# Patient Record
Sex: FEMALE | Race: BLACK OR AFRICAN AMERICAN | NOT HISPANIC OR LATINO | Employment: FULL TIME | URBAN - METROPOLITAN AREA
[De-identification: names, ages, dates, MRNs, and addresses within clinical notes are randomized per-mention and may not be internally consistent; named-entity substitution may affect disease eponyms.]

---

## 2022-05-04 ENCOUNTER — TELEPHONE (OUTPATIENT)
Dept: NEUROSURGERY | Facility: CLINIC | Age: 32
End: 2022-05-04
Payer: COMMERCIAL

## 2022-05-04 NOTE — TELEPHONE ENCOUNTER
Spoke with patient and told her Dr. Strong needs imaging on disk prior to appointment. She confiremd she will bring imaging by then.

## 2022-05-09 ENCOUNTER — OFFICE VISIT (OUTPATIENT)
Dept: NEUROSURGERY | Facility: CLINIC | Age: 32
End: 2022-05-09
Payer: COMMERCIAL

## 2022-05-09 VITALS — WEIGHT: 220 LBS | BODY MASS INDEX: 33.34 KG/M2 | HEIGHT: 68 IN

## 2022-05-09 DIAGNOSIS — I10 HYPERTENSION, UNSPECIFIED TYPE: ICD-10-CM

## 2022-05-09 DIAGNOSIS — G44.89 OTHER HEADACHE SYNDROME: Primary | ICD-10-CM

## 2022-05-09 DIAGNOSIS — E66.9 OBESITY (BMI 30.0-34.9): ICD-10-CM

## 2022-05-09 PROCEDURE — 99999 PR PBB SHADOW E&M-EST. PATIENT-LVL III: CPT | Mod: PBBFAC,,, | Performed by: STUDENT IN AN ORGANIZED HEALTH CARE EDUCATION/TRAINING PROGRAM

## 2022-05-09 PROCEDURE — 3008F PR BODY MASS INDEX (BMI) DOCUMENTED: ICD-10-PCS | Mod: CPTII,S$GLB,, | Performed by: STUDENT IN AN ORGANIZED HEALTH CARE EDUCATION/TRAINING PROGRAM

## 2022-05-09 PROCEDURE — 1160F PR REVIEW ALL MEDS BY PRESCRIBER/CLIN PHARMACIST DOCUMENTED: ICD-10-PCS | Mod: CPTII,S$GLB,, | Performed by: STUDENT IN AN ORGANIZED HEALTH CARE EDUCATION/TRAINING PROGRAM

## 2022-05-09 PROCEDURE — 1159F PR MEDICATION LIST DOCUMENTED IN MEDICAL RECORD: ICD-10-PCS | Mod: CPTII,S$GLB,, | Performed by: STUDENT IN AN ORGANIZED HEALTH CARE EDUCATION/TRAINING PROGRAM

## 2022-05-09 PROCEDURE — 1159F MED LIST DOCD IN RCRD: CPT | Mod: CPTII,S$GLB,, | Performed by: STUDENT IN AN ORGANIZED HEALTH CARE EDUCATION/TRAINING PROGRAM

## 2022-05-09 PROCEDURE — 99999 PR PBB SHADOW E&M-EST. PATIENT-LVL III: ICD-10-PCS | Mod: PBBFAC,,, | Performed by: STUDENT IN AN ORGANIZED HEALTH CARE EDUCATION/TRAINING PROGRAM

## 2022-05-09 PROCEDURE — 1160F RVW MEDS BY RX/DR IN RCRD: CPT | Mod: CPTII,S$GLB,, | Performed by: STUDENT IN AN ORGANIZED HEALTH CARE EDUCATION/TRAINING PROGRAM

## 2022-05-09 PROCEDURE — 99203 PR OFFICE/OUTPT VISIT, NEW, LEVL III, 30-44 MIN: ICD-10-PCS | Mod: S$GLB,,, | Performed by: STUDENT IN AN ORGANIZED HEALTH CARE EDUCATION/TRAINING PROGRAM

## 2022-05-09 PROCEDURE — 99203 OFFICE O/P NEW LOW 30 MIN: CPT | Mod: S$GLB,,, | Performed by: STUDENT IN AN ORGANIZED HEALTH CARE EDUCATION/TRAINING PROGRAM

## 2022-05-09 PROCEDURE — 3008F BODY MASS INDEX DOCD: CPT | Mod: CPTII,S$GLB,, | Performed by: STUDENT IN AN ORGANIZED HEALTH CARE EDUCATION/TRAINING PROGRAM

## 2022-05-09 RX ORDER — AMLODIPINE BESYLATE 5 MG/1
5 TABLET ORAL NIGHTLY
COMMUNITY
Start: 2022-05-03

## 2022-05-09 RX ORDER — MECLIZINE HYDROCHLORIDE 25 MG/1
25 TABLET ORAL 3 TIMES DAILY PRN
COMMUNITY
Start: 2022-04-06

## 2022-05-09 RX ORDER — HYDROCHLOROTHIAZIDE 25 MG/1
25 TABLET ORAL DAILY
COMMUNITY

## 2022-05-09 RX ORDER — PROPRANOLOL HYDROCHLORIDE 10 MG/1
10 TABLET ORAL 2 TIMES DAILY
COMMUNITY
Start: 2022-05-03

## 2022-05-09 RX ORDER — ACETAZOLAMIDE 250 MG/1
250 TABLET ORAL 2 TIMES DAILY
COMMUNITY
Start: 2022-05-03 | End: 2022-05-31

## 2022-05-09 NOTE — PROGRESS NOTES
Neurosurgery  History & Physical    SUBJECTIVE:     Chief Complaint: pseudotumor cerebri    History of Present Illness:  Magdalena Valdovinos is a 30 yo female who was referred by Sagrario Golden NP for evaluation of prior diagnosis of pseudotumor cerebri.  She reports daily headaches associated with dizziness with onset in October 2021 that have been progressive and poorly controled.  Symptoms occur daily and can be in the morning or afternoon and are 8-9/10 described as dull pain radiating from occiput to eyes. +photosensitivity and phonophobia.  Had poorly controlled hypertension with recent improvement on HCTZ, amlodipine and diamox. Takes Nurtec qod.      Per review of outside of outside records, she has seen ENT, neurology and cardiology. She had an outside MRI/MRA/MRV with reported narrowing of the bilateral transverse sinuses.    Rekha Ott NP- neurology  Dr. Mao Mas, MS ([facility])- last eye exam nml per report in April 2022    BMI 33.45    Review of patient's allergies indicates:  Not on File    No current outpatient medications on file.     No current facility-administered medications for this visit.       No past medical history on file.  No past surgical history on file.  Family History    None       Social History     Socioeconomic History    Marital status: Single       Review of Systems   Eyes: Positive for photophobia.   Neurological: Positive for dizziness and headaches.   All other systems reviewed and are negative.      OBJECTIVE:     Vital Signs     There is no height or weight on file to calculate BMI.      Physical Exam:  Nursing note and vitals reviewed.  General: well developed, well nourished, no distress.   Pulmonary: no signs of respiratory distress, comfortable appearing on room air  Abdomen: soft, non-distended, not tender to palpation  Skin: Skin is warm, dry and intact.  Extremities: no edema, intact distal pulses    Neuro:  Mental Status: Alert and oriented.  Oriented x 4  Language/language: No aphasia. No dysarthria. Names 3/3 objects & repetition intact  Cranial nerves: PERRL, EOMI, face symmetric, tongue/palate midline  Sensory: intact to light touch throughout  Motor Strength:Moves all extremities spontaneously with good tone.  Full strength upper and lower extremities. No abnormal movements seen. No pronator drift  Cerebellar: Finger-to-nose: intact bilaterally   Gait stable, fluid. mild difficulty with tandem gait: Able to walk on heels & toes    Diagnostic Results:  MRI/MRA/MRV brain w/wo was personally reviewed- cavum septum pellucidum with normal ventricular size without evidence of hydrocephalus, no mass lesion or vascular malformation appreciated.  Dural venous sinuses appear patent, some distal narrowing of the bilateral transverse sinuses as noted on outside radiology report    ASSESSMENT/PLAN:     30 yo female with history of poorly controlled hypertension and daily headaches.  I reviewed the imaging findings with the patient and her mother.  Will refer to neurology for headache management and additional evaluation.  No neurosurgical intervention is currently planned and patient can follow up as needed.        Note dictated with voice recognition software, please excuse any grammatical errors.

## 2022-05-10 ENCOUNTER — TELEPHONE (OUTPATIENT)
Dept: NEUROLOGY | Facility: CLINIC | Age: 32
End: 2022-05-10
Payer: COMMERCIAL

## 2022-05-10 NOTE — TELEPHONE ENCOUNTER
"Referral received from Dr. Strong for "other HA syndrome". Pt has been followed previously by Rekha Ott NP of [facility] in [place]. She is currently on propanolol 10mg BID and Nurtec(started in March 2022) every other day. Pt will sometimes use Excedrin for abortive relief (moderate but not complete).    Would have offered VV appt but patient resides in [place]. In-person appt offered/accepted for May 17th @ 8:30am with DEE Sims. Appt details viewable in patient portal.   "

## 2022-05-10 NOTE — TELEPHONE ENCOUNTER
----- Message from Sean Hinton sent at 5/10/2022  1:05 PM CDT -----  Type:  Sooner Apoointment Request    Caller is requesting a sooner appointment.  Caller declined first available appointment listed below.  Caller will not accept being placed on the waitlist and is requesting a message be sent to doctor.  Name of Caller:Magdalena Bonifacio     When is the first available appointment?no available appointments    Symptoms:Other headache syndrome     Would the patient rather a call back or a response via [facility]? Call back     Best Call Back Number:[phone] (mobile)     Additional Information:Referring Provider: KARIME HINES

## 2022-05-18 ENCOUNTER — TELEPHONE (OUTPATIENT)
Dept: NEUROLOGY | Facility: CLINIC | Age: 32
End: 2022-05-18
Payer: COMMERCIAL

## 2022-05-18 NOTE — TELEPHONE ENCOUNTER
Spoke with patient and informed her that provider does not do short term/long term disability. However, provider can do intermediate disability and patient verbally understood.    Also let patient know to bring in any FMLA forms that may need to be signed to help accommodate patient's need.

## 2022-05-18 NOTE — TELEPHONE ENCOUNTER
----- Message from Sindy Beto sent at 5/18/2022 11:00 AM CDT -----  Patient Call Back    Who Called: PT     What is the request in detail: pt calling to speak with someone regarding her upcoming appointment. The pt stated that this appointment is for a second opinion. The pt stated that she is currently on short term disability and her job have some questions, if she should return back to work before this visit. Pls advise.    Can the clinic reply by [facility]?    Best Call Back Number: [phone]       Edouard Marte)  Geriatric Medicine; Internal Medicine  [address] [place] [address]  Phone: [phone]  Fax: [phone]  Established Patient  Follow Up Time: 1 month

## 2022-05-31 ENCOUNTER — OFFICE VISIT (OUTPATIENT)
Dept: NEUROLOGY | Facility: CLINIC | Age: 32
End: 2022-05-31
Payer: COMMERCIAL

## 2022-05-31 VITALS
BODY MASS INDEX: 36.34 KG/M2 | DIASTOLIC BLOOD PRESSURE: 69 MMHG | SYSTOLIC BLOOD PRESSURE: 110 MMHG | WEIGHT: 231.5 LBS | HEART RATE: 63 BPM | HEIGHT: 67 IN

## 2022-05-31 DIAGNOSIS — H53.8 BLURRY VISION: ICD-10-CM

## 2022-05-31 DIAGNOSIS — G43.109 MIGRAINE WITH AURA AND WITHOUT STATUS MIGRAINOSUS, NOT INTRACTABLE: Primary | ICD-10-CM

## 2022-05-31 DIAGNOSIS — G93.2 IIH (IDIOPATHIC INTRACRANIAL HYPERTENSION): ICD-10-CM

## 2022-05-31 DIAGNOSIS — G44.89 OTHER HEADACHE SYNDROME: ICD-10-CM

## 2022-05-31 PROCEDURE — 1160F PR REVIEW ALL MEDS BY PRESCRIBER/CLIN PHARMACIST DOCUMENTED: ICD-10-PCS | Mod: CPTII,S$GLB,, | Performed by: PHYSICIAN ASSISTANT

## 2022-05-31 PROCEDURE — 99999 PR PBB SHADOW E&M-EST. PATIENT-LVL V: ICD-10-PCS | Mod: PBBFAC,,, | Performed by: PHYSICIAN ASSISTANT

## 2022-05-31 PROCEDURE — 1159F PR MEDICATION LIST DOCUMENTED IN MEDICAL RECORD: ICD-10-PCS | Mod: CPTII,S$GLB,, | Performed by: PHYSICIAN ASSISTANT

## 2022-05-31 PROCEDURE — 3078F PR MOST RECENT DIASTOLIC BLOOD PRESSURE < 80 MM HG: ICD-10-PCS | Mod: CPTII,S$GLB,, | Performed by: PHYSICIAN ASSISTANT

## 2022-05-31 PROCEDURE — 99999 PR PBB SHADOW E&M-EST. PATIENT-LVL V: CPT | Mod: PBBFAC,,, | Performed by: PHYSICIAN ASSISTANT

## 2022-05-31 PROCEDURE — 3078F DIAST BP <80 MM HG: CPT | Mod: CPTII,S$GLB,, | Performed by: PHYSICIAN ASSISTANT

## 2022-05-31 PROCEDURE — 99205 PR OFFICE/OUTPT VISIT, NEW, LEVL V, 60-74 MIN: ICD-10-PCS | Mod: S$GLB,,, | Performed by: PHYSICIAN ASSISTANT

## 2022-05-31 PROCEDURE — 3074F PR MOST RECENT SYSTOLIC BLOOD PRESSURE < 130 MM HG: ICD-10-PCS | Mod: CPTII,S$GLB,, | Performed by: PHYSICIAN ASSISTANT

## 2022-05-31 PROCEDURE — 99205 OFFICE O/P NEW HI 60 MIN: CPT | Mod: S$GLB,,, | Performed by: PHYSICIAN ASSISTANT

## 2022-05-31 PROCEDURE — 1160F RVW MEDS BY RX/DR IN RCRD: CPT | Mod: CPTII,S$GLB,, | Performed by: PHYSICIAN ASSISTANT

## 2022-05-31 PROCEDURE — 3008F BODY MASS INDEX DOCD: CPT | Mod: CPTII,S$GLB,, | Performed by: PHYSICIAN ASSISTANT

## 2022-05-31 PROCEDURE — 3008F PR BODY MASS INDEX (BMI) DOCUMENTED: ICD-10-PCS | Mod: CPTII,S$GLB,, | Performed by: PHYSICIAN ASSISTANT

## 2022-05-31 PROCEDURE — 3074F SYST BP LT 130 MM HG: CPT | Mod: CPTII,S$GLB,, | Performed by: PHYSICIAN ASSISTANT

## 2022-05-31 PROCEDURE — 1159F MED LIST DOCD IN RCRD: CPT | Mod: CPTII,S$GLB,, | Performed by: PHYSICIAN ASSISTANT

## 2022-05-31 RX ORDER — ACETAZOLAMIDE 250 MG/1
TABLET ORAL
Qty: 150 TABLET | Refills: 0 | Status: SHIPPED | OUTPATIENT
Start: 2022-05-31 | End: 2022-07-30

## 2022-05-31 RX ORDER — NAPROXEN 250 MG/1
250 TABLET ORAL 2 TIMES DAILY PRN
Qty: 20 TABLET | Refills: 5 | Status: SHIPPED | OUTPATIENT
Start: 2022-05-31

## 2022-05-31 NOTE — PATIENT INSTRUCTIONS
Naproxen Dosing:   Take 250 to 500 mg twice in a day as needed for headaches.       Supplements for Migraine:  1. Magnesium Oxide - 400mg by mouth daily  2. Riboflavin (Vitamin B2) - 400mg by mouth daily  3. Coenzyme Q10 - 200mg tablet by mouth daily

## 2022-05-31 NOTE — PROGRESS NOTES
New Patient     SUBJECTIVE:  Patient ID: Magdalena Valdovinos   MRN: [id number]  Referred By: Dr. Lexus Strong  Chief Complaint: Headache      History of Present Illness:   31 y.o. female with IIH and HTN, who presents to clinic alone for evaluation of headaches.   PMHx negative for TBI, Meningitis, Aneurysms, Kidney Stones, asthma, GI bleed, osteoporosis, CAD/MI, CVA/TIA, DM, cancer, pregnancy   Family Hx negative for Migraines     Pt has a h/o sinus Ha's in the past. They're described as a unilateral fronto temporalis, occipitalis, and retro-orbital sharp, pressure like pain with associated photophobia, phonophobia, nausea, and dizziness. However, since August/October, her HA's have worsened in frequency, severity, duration. They were initially thought to be triggered by her uncontrolled BP's. However, Ha's have continued despite control of BP w/ norvasc. Pt denies any other changes at this time. She saw a neurologist Dr. Brittney Ott at [facility] in [place] who dx her with IIH and migraines, and was given nurtec ppx, diamox (mild numbness/tingling in lips, feet, hands), propranolol, and meclizine. Feels they have not helped. Here for 2nd opinion. Pt was also seen by cardiology and ENT for her dizziness/lightheadedness. Loop recorder placed, Tilt table was also performed. No abnormalities noted. Independent review and neuro-radiology review of imaging MRI/MRV/MRA w/ reported narrowing of b/l transverse sinuses. No other findings noted. Pt was seen by nsgy 5/9/22 who referred pt to neuro for IIH. She was seen by optometrist Dr. Damon in [facility], last eye appt in March was normal.   Pt describes her current Ha's as a daily midfrontalis and occipitalis dull, pulsating pain. Debilitating Ha's occur 3 times a week. It is associated with phonophobia, dizziness/lightheadedness, photophobia, nausea, left ear fullness, visual spots, and blurry vision. Denies positional components and ataxa. HA's can last 5 - 24  "hours. Severity ranges 5 - 9/10.  Triggers - light, noise, sunlight  Alleviating Factors - dark room, quiet space, relax  Time of day of most headaches- anytime, upon awakening   Sleep - ?snores, denies apneic awakenings or awakening coughing/choking/gasping    Treatments Tried   Propranolol  Diamox  nurtec  meclizne  excedrin - helps    Social History  Alcohol - denies  Smoke - denies  Recreational Drug Use- denies    Current Medications:    Current Outpatient Medications:     amLODIPine (NORVASC) 5 MG tablet, Take 5 mg by mouth nightly., Disp: , Rfl:     hydroCHLOROthiazide (HYDRODIURIL) 25 MG tablet, Take 25 mg by mouth once daily., Disp: , Rfl:     meclizine (ANTIVERT) 25 mg tablet, Take 25 mg by mouth 3 (three) times daily as needed., Disp: , Rfl:     propranoloL (INDERAL) 10 MG tablet, Take 10 mg by mouth 2 (two) times daily., Disp: , Rfl:     acetaZOLAMIDE (DIAMOX) 250 MG tablet, Take 1 tablet (250 mg total) by mouth 2 (two) times daily for 30 days, THEN 1 tablet (250 mg total) 3 (three) times daily., Disp: 150 tablet, Rfl: 0    naproxen (NAPROSYN) 250 MG tablet, Take 1 tablet (250 mg total) by mouth 2 (two) times daily as needed (for headaches)., Disp: 20 tablet, Rfl: 5    Review of Systems - as per HPI, otherwise a balanced 10 systems review is negative.    OBJECTIVE:  Vitals:  /69   Pulse 63   Ht 5' 7" (1.702 m)   Wt 105 kg (231 lb 7.7 oz)   BMI 36.26 kg/m²     Physical Exam   Constitutional: she appears well-developed and well-nourished. she is well groomed. NAD  HENT:    Head: Normocephalic and atraumatic, Frontalis was TTP, temporalis was TTP   Eyes: Conjunctivae and EOM are normal. Pupils are equal, round, and reactive to light   Neck: Neck supple. Occiput and trapezius TTP   Musculoskeletal: Normal range of motion. No joint stiffness. No vertebral point tenderness.  Skin: Skin is warm and dry.  Psychiatric: Normal mood and affect.     Neuro exam:    Mental status:  The patient is alert " and oriented to person, place and time.  Language is intact and fluent  Remote and recent memory are intact  Normal attention and concentration  Mood is stable    Cranial Nerves:  Fundoscopic examination does not reveal any occult papilledema.    Pupils are equal and reactive to light.    Extraocular movements are intact and without nystagmus.    Visual fields are full to confrontation testing.   Facial movement is symmetric.  Facial sensation is intact.    Hearing is intact   FROM of neck in all (6) directions without pain  Shoulder shrug symmetrical.    Coordination:     Finger to nose - normal and symmetric bilaterally     Motor:  Normal muscle bulk and symmetry. No fasciculations were noted.   Tremor not apparent   Pronator drift not apparent.    strength was strong and symmetric  Finger extension strength was strong and symmetric  RUE:appropriate against gravity and medium force as tested 5/5  LUE: appropriate against gravity and medium force as tested 5/5  RLE:appropriate against gravity and medium force as tested 5/5              LLE: appropriate against gravity and medium force as tested 5/5    Sensory:  RUE  intact light touch  LUE intact light touch  RLE intact light touch  LLE intact light touch    Gait:   Normal gait  Tandem, Heel, and Toe Walk - able to perform without difficulty    Review of Data:   Notes from nsgy reviewed   Labs:  No visits with results within 3 Month(s) from this visit.   Latest known visit with results is:   No results found for any previous visit.     Imaging:  No results found for this or any previous visit.  Note: I have independently reviewed any/all imaging/labs/tests and agree with the report (s) as documented.  Any discrepancies will be as noted/demarcated by free text.  KJ ALEGRE 5/31/2022    ASSESSMENT:  1. Migraine with aura and without status migrainosus, not intractable    2. Other headache syndrome    3. Blurry vision    4. IIH (idiopathic intracranial  hypertension)          PLAN:  - Discussed symptoms appear to be consistent with migraines and questionable IIH, discussed treatment options and patient agreed with the following plan:  - to r/o IIH will send pt to neuro-ophtho to r/o papilledema and will increase diamox, defers LP  - if pt fails increased diamox, will try tpx next  - abortive - naproxen  - HTN - stable, continue anti-htn med, mgmt per pcp  - risks, benefits, and potential side effects of diamox, naproxen discussed   - alternative treatment options offered   - importance of healthy diet, regular exercise and sleep hygiene in the treatment of headaches    - Start tracking headaches via Migraine Buddy xiao on phone   - RTC in 2-3 mo     Orders Placed This Encounter    Ambulatory referral/consult to Ophthalmology    naproxen (NAPROSYN) 250 MG tablet    acetaZOLAMIDE (DIAMOX) 250 MG tablet       I have discussed realistic goals of care with patient at length as well as medication options, and need for lifestyle adjustment. I have explained that treatment will take time. We have agreed that the goal will be to reduce frequency/intensity/quantity of HA, not to be completely HA free. I have explained my non narcotic policy regarding headache treatment.    Patient agreeable to work on lifestyle adjustments.    Discussed potential for teratogenicity with treatment, patient understands if her family planning status should change she will contact office immediately and we will safely adjust medications as needed.     Questions and concerns were sought and answered to the patient's stated verbal satisfaction.  The patient verbalizes understanding and agreement with the above stated treatment plan.     CC: MADELINE Espinoza PA-C  [facility]  [phone]    Dr. Lantigua was available during today's encounter.     I spent 75 minutes on the day of this encounter preparing for, treating and managing this patient presenting  with headaches.